# Patient Record
Sex: MALE | Race: WHITE | NOT HISPANIC OR LATINO | URBAN - METROPOLITAN AREA
[De-identification: names, ages, dates, MRNs, and addresses within clinical notes are randomized per-mention and may not be internally consistent; named-entity substitution may affect disease eponyms.]

---

## 2017-03-20 ENCOUNTER — CHARTING TRANS (OUTPATIENT)
Dept: UROLOGY | Age: 67
End: 2017-03-20

## 2017-03-21 ENCOUNTER — CHARTING TRANS (OUTPATIENT)
Dept: OTHER | Age: 67
End: 2017-03-21

## 2017-05-08 ENCOUNTER — LAB SERVICES (OUTPATIENT)
Dept: OTHER | Age: 67
End: 2017-05-08

## 2017-05-08 LAB — PSA SERPL-MCNC: 3.76 NG/ML (ref 0–4.6)

## 2017-05-09 ENCOUNTER — CHARTING TRANS (OUTPATIENT)
Dept: OTHER | Age: 67
End: 2017-05-09

## 2017-05-15 ENCOUNTER — CHARTING TRANS (OUTPATIENT)
Dept: OTHER | Age: 67
End: 2017-05-15

## 2017-05-15 ENCOUNTER — CHARTING TRANS (OUTPATIENT)
Dept: UROLOGY | Age: 67
End: 2017-05-15

## 2017-11-30 ENCOUNTER — LAB SERVICES (OUTPATIENT)
Dept: OTHER | Age: 67
End: 2017-11-30

## 2017-11-30 LAB — PSA SERPL-MCNC: 3.99 NG/ML (ref 0–4.6)

## 2017-12-01 ENCOUNTER — CHARTING TRANS (OUTPATIENT)
Dept: OTHER | Age: 67
End: 2017-12-01

## 2017-12-04 ENCOUNTER — CHARTING TRANS (OUTPATIENT)
Dept: UROLOGY | Age: 67
End: 2017-12-04

## 2018-02-14 ENCOUNTER — LAB SERVICES (OUTPATIENT)
Dept: OTHER | Age: 68
End: 2018-02-14

## 2018-02-14 LAB
ALBUMIN SERPL BCG-MCNC: 4 G/DL (ref 3.6–5.1)
ALP SERPL-CCNC: 96 U/L (ref 30–130)
ALT SERPL W/O P-5'-P-CCNC: 40 U/L (ref 17–47)
AST SERPL-CCNC: 31 U/L (ref 14–43)
BASOPHIL %: 0.3 % (ref 0–1.2)
BASOPHIL ABSOLUTE #: 0 10*3/UL (ref 0–0.1)
BILIRUB SERPL-MCNC: 0.7 MG/DL (ref 0–1.3)
BUN SERPL-MCNC: 21 MG/DL (ref 6–27)
CALCIUM SERPL-MCNC: 9.7 MG/DL (ref 8.6–10.6)
CHLORIDE SERPL-SCNC: 103 MMOL/L (ref 96–107)
CHOLEST SERPL-MCNC: 153 MG/DL (ref 140–200)
CREATININE, SERUM: 1 MG/DL (ref 0.6–1.6)
DIFFERENTIAL TYPE: ABNORMAL
EOSINOPHIL %: 5.8 % (ref 0–10)
EOSINOPHIL ABSOLUTE #: 0.6 10*3/UL (ref 0–0.5)
GFR SERPL CREATININE-BSD FRML MDRD: >60 ML/MIN/{1.73M2}
GFR SERPL CREATININE-BSD FRML MDRD: >60 ML/MIN/{1.73M2}
GLUCOSE P FAST SERPL-MCNC: 93 MG/DL (ref 60–100)
HCO3 SERPL-SCNC: 30 MMOL/L (ref 22–32)
HDLC SERPL-MCNC: 27 MG/DL
HEMATOCRIT: 43.6 % (ref 40–51)
HEMOGLOBIN: 14.6 G/DL (ref 13.7–17.5)
LDLC SERPL CALC-MCNC: 91 MG/DL (ref 30–100)
LYMPH PERCENT: 42.7 % (ref 20.5–51.1)
LYMPHOCYTE ABSOLUTE #: 4.1 10*3/UL (ref 1.2–3.4)
MEAN CORPUSCULAR HGB CONCENTRATION: 33.5 % (ref 32–36)
MEAN CORPUSCULAR HGB: 28.4 PG (ref 27–34)
MEAN CORPUSCULAR VOLUME: 84.8 FL (ref 79–95)
MEAN PLATELET VOLUME: 11.6 FL (ref 8.6–12.4)
MONOCYTE ABSOLUTE #: 0.8 10*3/UL (ref 0.2–0.9)
MONOCYTE PERCENT: 7.9 % (ref 4.3–12.9)
NEUTROPHIL ABSOLUTE #: 4.2 10*3/UL (ref 1.4–6.5)
NEUTROPHIL PERCENT: 43.3 % (ref 34–73.5)
PLATELET COUNT: 123 10*3/UL (ref 150–400)
POTASSIUM SERPL-SCNC: 4.5 MMOL/L (ref 3.5–5.3)
PROT SERPL-MCNC: 7.2 G/DL (ref 6.4–8.5)
RED BLOOD CELL COUNT: 5.14 10*6/UL (ref 3.9–5.7)
RED CELL DISTRIBUTION WIDTH: 14.3 % (ref 11.3–14.8)
SODIUM SERPL-SCNC: 141 MMOL/L (ref 136–146)
T4 FREE SERPL-MCNC: 1.23 NG/DL (ref 0.78–2.19)
TRIGL SERPL-MCNC: 175 MG/DL (ref 0–200)
TSH SERPL-ACNC: 0.41 M[IU]/L (ref 0.3–4.82)
WHITE BLOOD CELL COUNT: 9.6 10*3/UL (ref 4–10)

## 2018-02-15 LAB — FAX RESULTS: NORMAL

## 2018-05-17 ENCOUNTER — CHARTING TRANS (OUTPATIENT)
Dept: OTHER | Age: 68
End: 2018-05-17

## 2018-06-14 ENCOUNTER — LAB SERVICES (OUTPATIENT)
Dept: OTHER | Age: 68
End: 2018-06-14

## 2018-06-14 ENCOUNTER — CHARTING TRANS (OUTPATIENT)
Dept: OTHER | Age: 68
End: 2018-06-14

## 2018-06-14 LAB — PSA SERPL-MCNC: 5.44 NG/ML (ref 0–4.6)

## 2018-06-15 ENCOUNTER — CHARTING TRANS (OUTPATIENT)
Dept: OTHER | Age: 68
End: 2018-06-15

## 2018-06-21 ENCOUNTER — CHARTING TRANS (OUTPATIENT)
Dept: OTHER | Age: 68
End: 2018-06-21

## 2018-06-21 ENCOUNTER — MYAURORA ACCOUNT LINK (OUTPATIENT)
Dept: OTHER | Age: 68
End: 2018-06-21

## 2018-09-20 ENCOUNTER — CHARTING TRANS (OUTPATIENT)
Dept: OTHER | Age: 68
End: 2018-09-20

## 2018-09-21 ENCOUNTER — LAB SERVICES (OUTPATIENT)
Dept: OTHER | Age: 68
End: 2018-09-21

## 2018-09-22 LAB
BILIRUBIN URINE: NEGATIVE
BLOOD URINE: ABNORMAL
CLARITY: CLEAR
COLOR: YELLOW
GLUCOSE QUALITATIVE U: NEGATIVE
KETONES, URINE: NEGATIVE
LEUKOCYTE ESTERASE URINE: ABNORMAL
NITRITE URINE: NEGATIVE
PH URINE: 5 (ref 5–7)
SPECIFIC GRAVITY URINE: 1.03 (ref 1–1.03)
URINE PROTEIN, QUAL (DIPSTICK): NEGATIVE
UROBILINOGEN URINE: 2

## 2018-09-26 ENCOUNTER — CHARTING TRANS (OUTPATIENT)
Dept: OTHER | Age: 68
End: 2018-09-26

## 2018-11-26 ENCOUNTER — CHARTING TRANS (OUTPATIENT)
Dept: OTHER | Age: 68
End: 2018-11-26

## 2018-11-27 ENCOUNTER — OFFICE VISIT (OUTPATIENT)
Dept: OTOLARYNGOLOGY | Facility: CLINIC | Age: 68
End: 2018-11-27
Payer: MEDICARE

## 2018-11-27 ENCOUNTER — OFFICE VISIT (OUTPATIENT)
Dept: AUDIOLOGY | Facility: CLINIC | Age: 68
End: 2018-11-27
Payer: MEDICARE

## 2018-11-27 VITALS
DIASTOLIC BLOOD PRESSURE: 64 MMHG | WEIGHT: 225 LBS | SYSTOLIC BLOOD PRESSURE: 128 MMHG | HEIGHT: 70 IN | TEMPERATURE: 98 F | BODY MASS INDEX: 32.21 KG/M2

## 2018-11-27 DIAGNOSIS — H90.72 MIXED CONDUCTIVE AND SENSORINEURAL HEARING LOSS OF LEFT EAR WITH UNRESTRICTED HEARING OF RIGHT EAR: ICD-10-CM

## 2018-11-27 DIAGNOSIS — H93.8X3 EAR PRESSURE, BILATERAL: Primary | ICD-10-CM

## 2018-11-27 DIAGNOSIS — H69.92 EUSTACHIAN TUBE DISORDER, LEFT: ICD-10-CM

## 2018-11-27 PROCEDURE — 92557 COMPREHENSIVE HEARING TEST: CPT | Performed by: AUDIOLOGIST

## 2018-11-27 PROCEDURE — 92567 TYMPANOMETRY: CPT | Performed by: AUDIOLOGIST

## 2018-11-27 PROCEDURE — 99203 OFFICE O/P NEW LOW 30 MIN: CPT | Performed by: OTOLARYNGOLOGY

## 2018-11-27 PROCEDURE — G0463 HOSPITAL OUTPT CLINIC VISIT: HCPCS | Performed by: OTOLARYNGOLOGY

## 2018-11-27 RX ORDER — TAMSULOSIN HYDROCHLORIDE 0.4 MG/1
CAPSULE ORAL
COMMUNITY
Start: 2018-11-26

## 2018-11-27 RX ORDER — PRAVASTATIN SODIUM 40 MG
TABLET ORAL
COMMUNITY
Start: 2018-09-17

## 2018-11-27 RX ORDER — LEVOTHYROXINE SODIUM 175 UG/1
TABLET ORAL
COMMUNITY
Start: 2018-09-17

## 2018-11-27 RX ORDER — FLUTICASONE PROPIONATE 50 MCG
1 SPRAY, SUSPENSION (ML) NASAL 2 TIMES DAILY
Qty: 1 BOTTLE | Refills: 3 | Status: SHIPPED | OUTPATIENT
Start: 2018-11-27

## 2018-11-27 NOTE — PROGRESS NOTES
Genia Childress is a 79year old male. Patient presents with:  Ear Problem: Bialteral ears feel plugged, pressure, left ear 6 yrs, right ear 6 wks     HPI:   He has had problems with pressure in his left ear for many years.   He always has problems when he fl nasopharyngeal masses   Nasal Normal External nose - Normal. Nasal septum - Normal, Turbinates - Normal   Neurological Normal Memory - Normal. Cranial nerves - Cranial nerves II through XII grossly intact.    Neck Exam Normal Inspection - Normal. Palpation

## 2018-11-28 VITALS — WEIGHT: 235 LBS | TEMPERATURE: 96 F | BODY MASS INDEX: 33.64 KG/M2 | HEIGHT: 70 IN

## 2018-11-28 VITALS — WEIGHT: 238 LBS | HEIGHT: 70 IN | BODY MASS INDEX: 34.07 KG/M2 | TEMPERATURE: 96.6 F

## 2018-11-28 VITALS — BODY MASS INDEX: 33.36 KG/M2 | TEMPERATURE: 97.5 F | WEIGHT: 233 LBS | HEIGHT: 70 IN

## 2018-11-30 NOTE — PROGRESS NOTES
AUDIOGRAM     Martina Lucas was referred for testing by [facility].   1/18/1950  [id number] Inspection:  Right ear:  No cerumen  Left ear:  No cerumen    Pt was seen due to bilateral pressure. Audiometric Test Results:   Audiometric thresho

## 2018-12-05 DIAGNOSIS — R97.20 ELEVATED PROSTATE SPECIFIC ANTIGEN (PSA): Primary | ICD-10-CM

## 2018-12-31 ENCOUNTER — LAB SERVICES (OUTPATIENT)
Dept: LAB | Age: 68
End: 2018-12-31

## 2018-12-31 DIAGNOSIS — R97.20 ELEVATED PROSTATE SPECIFIC ANTIGEN (PSA): ICD-10-CM

## 2018-12-31 LAB — PSA SERPL-MCNC: 3.13 NG/ML (ref 0–4.6)

## 2018-12-31 PROCEDURE — 36415 COLL VENOUS BLD VENIPUNCTURE: CPT | Performed by: UROLOGY

## 2018-12-31 PROCEDURE — 84153 ASSAY OF PSA TOTAL: CPT | Performed by: UROLOGY

## 2019-01-01 ENCOUNTER — EXTERNAL RECORD (OUTPATIENT)
Dept: HEALTH INFORMATION MANAGEMENT | Facility: OTHER | Age: 69
End: 2019-01-01

## 2019-01-02 ENCOUNTER — OFFICE VISIT (OUTPATIENT)
Dept: UROLOGY | Age: 69
End: 2019-01-02

## 2019-01-02 VITALS — WEIGHT: 238 LBS | TEMPERATURE: 96.9 F | HEIGHT: 70 IN | BODY MASS INDEX: 34.07 KG/M2

## 2019-01-02 DIAGNOSIS — R97.20 ELEVATED PSA: Primary | ICD-10-CM

## 2019-01-02 DIAGNOSIS — R39.15 URINARY URGENCY: ICD-10-CM

## 2019-01-02 DIAGNOSIS — R35.0 URINARY FREQUENCY: ICD-10-CM

## 2019-01-02 PROCEDURE — 99214 OFFICE O/P EST MOD 30 MIN: CPT | Performed by: UROLOGY

## 2019-01-02 RX ORDER — TAMSULOSIN HYDROCHLORIDE 0.4 MG/1
CAPSULE ORAL
COMMUNITY
Start: 2018-11-26 | End: 2019-01-02 | Stop reason: SDUPTHER

## 2019-01-02 RX ORDER — FINASTERIDE 5 MG/1
5 TABLET, FILM COATED ORAL
COMMUNITY
Start: 2018-06-21 | End: 2019-01-02 | Stop reason: SDUPTHER

## 2019-01-02 RX ORDER — LEVOTHYROXINE SODIUM 175 UG/1
TABLET ORAL
COMMUNITY
Start: 2018-09-17

## 2019-01-02 RX ORDER — TAMSULOSIN HYDROCHLORIDE 0.4 MG/1
0.4 CAPSULE ORAL
Qty: 90 CAPSULE | Refills: 3 | Status: SHIPPED | OUTPATIENT
Start: 2019-01-02 | End: 2019-06-20 | Stop reason: SDUPTHER

## 2019-01-02 RX ORDER — PRAVASTATIN SODIUM 40 MG
TABLET ORAL
COMMUNITY
Start: 2018-09-17

## 2019-01-02 RX ORDER — FLUTICASONE PROPIONATE 50 MCG
1 SPRAY, SUSPENSION (ML) NASAL
COMMUNITY
Start: 2018-11-27

## 2019-01-02 RX ORDER — FINASTERIDE 5 MG/1
5 TABLET, FILM COATED ORAL DAILY
Qty: 90 TABLET | Refills: 3 | Status: SHIPPED | OUTPATIENT
Start: 2019-01-02 | End: 2019-06-20 | Stop reason: SDUPTHER

## 2019-01-02 SDOH — HEALTH STABILITY: MENTAL HEALTH: HOW OFTEN DO YOU HAVE A DRINK CONTAINING ALCOHOL?: NEVER

## 2019-02-26 ENCOUNTER — TELEPHONE (OUTPATIENT)
Dept: UROLOGY | Age: 69
End: 2019-02-26

## 2019-03-05 ENCOUNTER — OFF PREMISE (OUTPATIENT)
Dept: PULMONOLOGY | Age: 69
End: 2019-03-05

## 2019-03-05 VITALS — WEIGHT: 230 LBS | HEIGHT: 70 IN | TEMPERATURE: 96.8 F | BODY MASS INDEX: 32.93 KG/M2

## 2019-03-05 DIAGNOSIS — G47.33 OBSTRUCTIVE SLEEP APNEA (ADULT) (PEDIATRIC): Primary | ICD-10-CM

## 2019-03-05 PROCEDURE — 95810 POLYSOM 6/> YRS 4/> PARAM: CPT | Performed by: INTERNAL MEDICINE

## 2019-06-13 ENCOUNTER — TELEPHONE (OUTPATIENT)
Dept: UROLOGY | Age: 69
End: 2019-06-13

## 2019-06-19 ENCOUNTER — APPOINTMENT (OUTPATIENT)
Dept: UROLOGY | Age: 69
End: 2019-06-19

## 2019-06-19 ENCOUNTER — LAB SERVICES (OUTPATIENT)
Dept: LAB | Age: 69
End: 2019-06-19

## 2019-06-19 DIAGNOSIS — R97.20 ELEVATED PSA: Primary | ICD-10-CM

## 2019-06-19 DIAGNOSIS — R97.20 ELEVATED PSA: ICD-10-CM

## 2019-06-19 LAB — PSA SERPL-MCNC: 3.1 NG/ML (ref 0–4.9)

## 2019-06-19 PROCEDURE — 36415 COLL VENOUS BLD VENIPUNCTURE: CPT | Performed by: UROLOGY

## 2019-06-19 PROCEDURE — 84153 ASSAY OF PSA TOTAL: CPT | Performed by: UROLOGY

## 2019-06-20 ENCOUNTER — OFFICE VISIT (OUTPATIENT)
Dept: UROLOGY | Age: 69
End: 2019-06-20

## 2019-06-20 VITALS — WEIGHT: 217 LBS | HEIGHT: 69 IN | TEMPERATURE: 97.5 F | BODY MASS INDEX: 32.14 KG/M2

## 2019-06-20 DIAGNOSIS — N52.9 ERECTILE DYSFUNCTION, UNSPECIFIED ERECTILE DYSFUNCTION TYPE: ICD-10-CM

## 2019-06-20 DIAGNOSIS — R39.15 URINARY URGENCY: ICD-10-CM

## 2019-06-20 DIAGNOSIS — R35.0 URINARY FREQUENCY: ICD-10-CM

## 2019-06-20 DIAGNOSIS — R97.20 ELEVATED PSA: Primary | ICD-10-CM

## 2019-06-20 PROCEDURE — 99214 OFFICE O/P EST MOD 30 MIN: CPT | Performed by: UROLOGY

## 2019-06-20 RX ORDER — FINASTERIDE 5 MG/1
5 TABLET, FILM COATED ORAL DAILY
Qty: 90 TABLET | Refills: 3 | Status: SHIPPED | OUTPATIENT
Start: 2019-06-20 | End: 2020-10-14 | Stop reason: SDUPTHER

## 2019-06-20 RX ORDER — TADALAFIL 20 MG/1
20 TABLET ORAL PRN
Qty: 8 TABLET | Refills: 11 | Status: SHIPPED | OUTPATIENT
Start: 2019-06-20 | End: 2020-10-14

## 2019-06-20 RX ORDER — TAMSULOSIN HYDROCHLORIDE 0.4 MG/1
0.4 CAPSULE ORAL
Qty: 90 CAPSULE | Refills: 3 | Status: SHIPPED | OUTPATIENT
Start: 2019-06-20 | End: 2020-10-14 | Stop reason: SDUPTHER

## 2019-06-20 SDOH — HEALTH STABILITY: MENTAL HEALTH: HOW OFTEN DO YOU HAVE A DRINK CONTAINING ALCOHOL?: NEVER

## 2020-02-20 ENCOUNTER — LAB SERVICES (OUTPATIENT)
Dept: LAB | Age: 70
End: 2020-02-20

## 2020-02-20 DIAGNOSIS — Z00.00 ROUTINE GENERAL MEDICAL EXAMINATION AT A HEALTH CARE FACILITY: Primary | ICD-10-CM

## 2020-02-20 DIAGNOSIS — R97.20 ELEVATED PSA: ICD-10-CM

## 2020-02-20 PROCEDURE — 84443 ASSAY THYROID STIM HORMONE: CPT | Performed by: PATHOLOGY

## 2020-02-20 PROCEDURE — 84439 ASSAY OF FREE THYROXINE: CPT | Performed by: PATHOLOGY

## 2020-02-25 DIAGNOSIS — E03.9 ACQUIRED HYPOTHYROIDISM: Primary | ICD-10-CM

## 2020-02-25 DIAGNOSIS — E55.9 VITAMIN D DEFICIENCY: ICD-10-CM

## 2020-02-25 LAB
T4 FREE SERPL-MCNC: 1.66 NG/DL (ref 0.78–2.19)
TSH SERPL DL<=0.05 MIU/L-ACNC: 0.44 M[IU]/L (ref 0.3–4.82)

## 2020-06-24 ENCOUNTER — APPOINTMENT (OUTPATIENT)
Dept: UROLOGY | Age: 70
End: 2020-06-24

## 2020-10-12 ENCOUNTER — LAB SERVICES (OUTPATIENT)
Dept: LAB | Age: 70
End: 2020-10-12

## 2020-10-12 DIAGNOSIS — R97.20 ELEVATED PSA: ICD-10-CM

## 2020-10-12 LAB — PSA SERPL-MCNC: 1.95 NG/ML (ref 0–4.9)

## 2020-10-12 PROCEDURE — 84153 ASSAY OF PSA TOTAL: CPT | Performed by: UROLOGY

## 2020-10-12 PROCEDURE — 36415 COLL VENOUS BLD VENIPUNCTURE: CPT | Performed by: UROLOGY

## 2020-10-13 ENCOUNTER — TELEPHONE (OUTPATIENT)
Dept: UROLOGY | Age: 70
End: 2020-10-13

## 2020-10-14 ENCOUNTER — OFFICE VISIT (OUTPATIENT)
Dept: UROLOGY | Age: 70
End: 2020-10-14

## 2020-10-14 VITALS — BODY MASS INDEX: 31.1 KG/M2 | HEIGHT: 69 IN | WEIGHT: 210 LBS

## 2020-10-14 DIAGNOSIS — R97.20 ELEVATED PSA: Primary | ICD-10-CM

## 2020-10-14 DIAGNOSIS — N40.1 BPH WITH OBSTRUCTION/LOWER URINARY TRACT SYMPTOMS: ICD-10-CM

## 2020-10-14 DIAGNOSIS — N13.8 BPH WITH OBSTRUCTION/LOWER URINARY TRACT SYMPTOMS: ICD-10-CM

## 2020-10-14 DIAGNOSIS — N52.9 ERECTILE DYSFUNCTION, UNSPECIFIED ERECTILE DYSFUNCTION TYPE: ICD-10-CM

## 2020-10-14 PROCEDURE — 99214 OFFICE O/P EST MOD 30 MIN: CPT | Performed by: UROLOGY

## 2020-10-14 RX ORDER — FINASTERIDE 5 MG/1
5 TABLET, FILM COATED ORAL DAILY
Qty: 90 TABLET | Refills: 3 | Status: SHIPPED | OUTPATIENT
Start: 2020-10-14

## 2020-10-14 RX ORDER — TAMSULOSIN HYDROCHLORIDE 0.4 MG/1
0.4 CAPSULE ORAL
Qty: 90 CAPSULE | Refills: 3 | Status: SHIPPED | OUTPATIENT
Start: 2020-10-14

## 2020-10-14 RX ORDER — TAMSULOSIN HYDROCHLORIDE 0.4 MG/1
0.4 CAPSULE ORAL
COMMUNITY
Start: 2018-11-26 | End: 2020-10-14 | Stop reason: SDUPTHER

## 2020-10-14 SDOH — HEALTH STABILITY: MENTAL HEALTH: HOW OFTEN DO YOU HAVE A DRINK CONTAINING ALCOHOL?: NEVER

## 2021-10-14 ENCOUNTER — APPOINTMENT (OUTPATIENT)
Dept: UROLOGY | Age: 71
End: 2021-10-14